# Patient Record
Sex: MALE | Race: WHITE | NOT HISPANIC OR LATINO | Employment: FULL TIME | URBAN - NONMETROPOLITAN AREA
[De-identification: names, ages, dates, MRNs, and addresses within clinical notes are randomized per-mention and may not be internally consistent; named-entity substitution may affect disease eponyms.]

---

## 2017-02-24 ENCOUNTER — HISTORY (OUTPATIENT)
Dept: FAMILY MEDICINE | Facility: OTHER | Age: 37
End: 2017-02-24

## 2017-02-24 ENCOUNTER — OFFICE VISIT - GICH (OUTPATIENT)
Dept: FAMILY MEDICINE | Facility: OTHER | Age: 37
End: 2017-02-24

## 2017-02-24 DIAGNOSIS — N50.811 PAIN IN RIGHT TESTICLE: ICD-10-CM

## 2018-01-03 NOTE — PROGRESS NOTES
Patient Information     Patient Name MRN Sex Steven See [id number] Male 1980      Progress Notes by Kaye Mercado NP at 2017 11:00 AM     Author:  Kaye Mercado NP Service:  (none) Author Type:  PHYS- Nurse Practitioner     Filed:  2017  9:10 AM Encounter Date:  2017 Status:  Signed     :  Kaye Mercado NP (PHYS- Nurse Practitioner)            SUBJECTIVE:    Steven Panda is a 36 y.o. male who presents for Testicular pain.     Testicle Pain   The patient's primary symptoms include testicular pain. The patient's pertinent negatives include no genital injury, genital itching, genital lesions, pelvic pain, penile discharge, penile pain, priapism or scrotal swelling. This is a new problem. The current episode started in the past 7 days. The problem occurs 2 to 4 times per day. The problem has been unchanged. The pain is mild. Pertinent negatives include no chest pain, chills, constipation, coughing, discolored urine, dysuria, fever, flank pain, frequency, hematuria, hesitancy, nausea, painful intercourse, rash, sore throat, urgency, urinary retention or vomiting. The testicular pain affects the right testicle. The color of the testicles is normal. The symptoms are aggravated by activity. He has tried nothing for the symptoms. He is sexually active. He never uses condoms. No, his partner does not have an STD. There is no history of BPH, chlamydia, cryptorchidism, erectile aid use, erectile dysfunction, a femoral hernia, gonorrhea, herpes simplex, kidney stones, prostatitis or sickle cell disease.       No current outpatient prescriptions on file prior to visit.     No current facility-administered medications on file prior to visit.        REVIEW OF SYSTEMS:  Review of Systems   Constitutional: Negative for chills and fever.   HENT: Negative for sore throat.    Respiratory: Negative for cough.    Cardiovascular: Negative for chest pain.   Gastrointestinal: Negative  "for constipation, nausea and vomiting.   Genitourinary: Positive for testicular pain. Negative for discharge, dysuria, flank pain, frequency, hesitancy, pelvic pain, penile pain, scrotal swelling and urgency.   Skin: Negative for rash.       OBJECTIVE:  /80  Pulse 80  Temp 98.6  F (37  C) (Temporal)  Ht 1.956 m (6' 5\")  Wt 89.4 kg (197 lb)  BMI 23.36 kg/m2    EXAM:   Physical Exam   Constitutional: He is oriented to person, place, and time and well-developed, well-nourished, and in no distress.   HENT:   Head: Normocephalic and atraumatic.   Eyes: Conjunctivae are normal.   Neck: Neck supple.   Cardiovascular: Normal rate.    Pulmonary/Chest: Effort normal. No respiratory distress.   Genitourinary: Penis normal. He exhibits no abnormal testicular mass, no testicular tenderness, no abnormal scrotal mass, no scrotal tenderness and no epididymal tenderness. Cremasteric reflex is present. Penis exhibits no lesions and no edema. No discharge found.   Genitourinary Comments: There is no hernia present, no redness, swelling. Not painful on exam. States the tenderness is on the scrotum and Behind the teste. Denies injury, no pain with sex. Both testes are in normal anatomical position.    Lymphadenopathy:     He has no cervical adenopathy.   Neurological: He is alert and oriented to person, place, and time.   Nursing note and vitals reviewed.      ASSESSMENT/PLAN:    ICD-10-CM    1. Testicular pain, right N50.811         Plan:  He wears boxers, advised scrotal support such as briefs, and pillow support when at rest. Suspect he could just have a strain/ irritation form type of undergarment he wears. Advised watchful waiting, change to briefs and f/u Monday if not better. I explained my diagnostic considerations and recommendations to the patient, who voiced understanding and agreement with the treatment plan. All questions were answered. We discussed potential side effects of any prescribed or recommended therapies, as " well as expectations for response to treatments. He was advised to contact our office if there is no improvement or worsening of conditions or symptoms.  If s/s worsen or persist, patient will either come back or follow up with PCP.       JAQUELINE WILSON NP ....................  2/27/2017   9:10 AM

## 2018-01-27 VITALS
TEMPERATURE: 98.6 F | HEIGHT: 77 IN | DIASTOLIC BLOOD PRESSURE: 80 MMHG | SYSTOLIC BLOOD PRESSURE: 120 MMHG | HEART RATE: 80 BPM | BODY MASS INDEX: 23.26 KG/M2 | WEIGHT: 197 LBS

## 2018-02-23 ENCOUNTER — DOCUMENTATION ONLY (OUTPATIENT)
Dept: FAMILY MEDICINE | Facility: OTHER | Age: 38
End: 2018-02-23

## 2018-04-18 ENCOUNTER — OFFICE VISIT (OUTPATIENT)
Dept: PEDIATRICS | Facility: OTHER | Age: 38
End: 2018-04-18
Attending: INTERNAL MEDICINE
Payer: COMMERCIAL

## 2018-04-18 VITALS
BODY MASS INDEX: 22.79 KG/M2 | DIASTOLIC BLOOD PRESSURE: 76 MMHG | SYSTOLIC BLOOD PRESSURE: 110 MMHG | HEART RATE: 74 BPM | HEIGHT: 77 IN | WEIGHT: 193 LBS

## 2018-04-18 DIAGNOSIS — Z82.79 FAMILY HISTORY OF MARFAN SYNDROME: Primary | ICD-10-CM

## 2018-04-18 DIAGNOSIS — G80.9 CEREBRAL PALSY, UNSPECIFIED TYPE (H): ICD-10-CM

## 2018-04-18 DIAGNOSIS — Z82.49 FAMILY HISTORY OF AORTIC ANEURYSM: ICD-10-CM

## 2018-04-18 PROCEDURE — 99213 OFFICE O/P EST LOW 20 MIN: CPT | Performed by: INTERNAL MEDICINE

## 2018-04-18 ASSESSMENT — ANXIETY QUESTIONNAIRES
5. BEING SO RESTLESS THAT IT IS HARD TO SIT STILL: NOT AT ALL
7. FEELING AFRAID AS IF SOMETHING AWFUL MIGHT HAPPEN: NOT AT ALL
IF YOU CHECKED OFF ANY PROBLEMS ON THIS QUESTIONNAIRE, HOW DIFFICULT HAVE THESE PROBLEMS MADE IT FOR YOU TO DO YOUR WORK, TAKE CARE OF THINGS AT HOME, OR GET ALONG WITH OTHER PEOPLE: NOT DIFFICULT AT ALL
6. BECOMING EASILY ANNOYED OR IRRITABLE: NOT AT ALL
2. NOT BEING ABLE TO STOP OR CONTROL WORRYING: NOT AT ALL
3. WORRYING TOO MUCH ABOUT DIFFERENT THINGS: NOT AT ALL
GAD7 TOTAL SCORE: 0
1. FEELING NERVOUS, ANXIOUS, OR ON EDGE: NOT AT ALL

## 2018-04-18 ASSESSMENT — PATIENT HEALTH QUESTIONNAIRE - PHQ9: 5. POOR APPETITE OR OVEREATING: NOT AT ALL

## 2018-04-18 ASSESSMENT — PAIN SCALES - GENERAL: PAINLEVEL: NO PAIN (0)

## 2018-04-18 NOTE — PROGRESS NOTES
"Subjective  Steven Panda is a 37 year old male who presents for discuss Marfan syndrome.  Both his brother and his 2 children were diagnosed with Marfan syndrome.  His brother is 33 years old and has an aortic root aneurysm found 5-6 years ago.  He himself has a slight cerebral palsy on the left side which was diagnosed after birth.  He wonders if that might make it more difficult to find symptoms of Marfan.  No hyper flexibility.  No vision issues.  Had an eye exam within the last couple of years.  No pectus deformity.    Problem List/PMH: reviewed in EMR, and made relevant updates today.  Medications: reviewed in EMR, and made relevant updates today.  Allergies: reviewed in EMR, and made relevant updates today.    Social Hx:  Social History   Substance Use Topics     Smoking status: Former Smoker     Smokeless tobacco: Never Used     Alcohol use Yes     Social History     Social History Narrative     I reviewed social history and made relevant updates today.    Family Hx:   Family History   Problem Relation Age of Onset     No Known Problems Mother      No Known Problems Father      Marfan Syndrome Brother      Marfan Syndrome Niece      Marfan Syndrome Nephew        Objective  Vitals: reviewed in EMR.  /76 (BP Location: Right arm, Patient Position: Sitting, Cuff Size: Adult Large)  Pulse 74  Ht 6' 5\" (1.956 m)  Wt 193 lb (87.5 kg)  BMI 22.89 kg/m2    Gen: Pleasant male, NAD.  HEENT: MMM, no OP erythema.   Neck: Supple, no JVD, no bruits.  CV: RRR no m/r/g.   Pulm: CTAB no w/r/r  Neuro: Grossly intact  Msk: No lower extremity edema.  Skin: No concerning lesions.  Psychiatric: Normal affect and insight. Does not appear anxious or depressed.    Assessment    ICD-10-CM    1. Family history of Marfan syndrome Z82.79 GENETICS REFERRAL     Echocardiogram Complete   2. Family history of aortic aneurysm Z82.49 GENETICS REFERRAL     Echocardiogram Complete   3. Cerebral palsy, unspecified type (H) G80.9  "     Orders Placed This Encounter   Procedures     GENETICS REFERRAL     Echocardiogram Complete       I am hopeful that since there is no known case of Marfan in the generation just prior that this may be a spontaneous mutation in his brother which had been passed to his children.    Plan   -- Expected clinical course discussed   --Genetics consultation   --Echocardiogram    Signed, Richard Duggan MD  Internal Medicine & Pediatrics

## 2018-04-18 NOTE — NURSING NOTE
Patient presents to clinic for consult for Marfan's syndrome.  Brother was diagnosed with this.  Is wondering what testing could be done for him.  Marilee Donaldson LPN ....................  4/18/2018   1:34 PM

## 2018-04-18 NOTE — MR AVS SNAPSHOT
"              After Visit Summary   4/18/2018    Steven Panda    MRN: [id number]           Patient Information     Date Of Birth          1980        Visit Information        Provider Department      4/18/2018 1:45 PM Richard Duggan MD [facility]        Today's Diagnoses     Family history of Marfan syndrome    -  1    Family history of aortic aneurysm           Follow-ups after your visit        Additional Services     GENETICS REFERRAL       [facility] or any                  Future tests that were ordered for you today     Open Future Orders        Priority Expected Expires Ordered    Echocardiogram Complete Routine  4/18/2019 4/18/2018            Who to contact     If you have questions or need follow up information about today's clinic visit or your schedule please contact [facility] directly at [phone].  Normal or non-critical lab and imaging results will be communicated to you by Kenshohart, letter or phone within 4 business days after the clinic has received the results. If you do not hear from us within 7 days, please contact the clinic through Kenshohart or phone. If you have a critical or abnormal lab result, we will notify you by phone as soon as possible.  Submit refill requests through English Helper or call your pharmacy and they will forward the refill request to us. Please allow 3 business days for your refill to be completed.          Additional Information About Your Visit        Kenshohart Information     English Helper lets you send messages to your doctor, view your test results, renew your prescriptions, schedule appointments and more. To sign up, go to [email or web address] . Click on \"Log in\" on the left side of the screen, which will take you to the Welcome page. Then click on \"Sign up Now\" on the right side of the page.     You will be asked to enter the access code listed below, as well as some personal information. Please follow the directions to " "create your username and password.     Your access code is: [email or web address]  Expires: 2018  2:00 PM     Your access code will  in 90 days. If you need help or a new code, please call your [facility] or [phone].        Care EveryWhere ID     This is your Care EveryWhere ID. This could be used by other organizations to access your [place] medical records  [id number]        Your Vitals Were     Pulse Height BMI (Body Mass Index)             74 6' 5\" (1.956 m) 22.89 kg/m2          Blood Pressure from Last 3 Encounters:   18 110/76   17 120/80   14 128/80    Weight from Last 3 Encounters:   18 193 lb (87.5 kg)   17 197 lb (89.4 kg)   14 180 lb 12.8 oz (82 kg)              We Performed the Following     GENETICS REFERRAL        Primary Care Provider Fax #    Physician No Ref-Primary [phone]       No address on file        Equal Access to Services     [facility]: Hadii aad ku hadasho Soanisa, waaxda luqadaha, qaybta kaalmada adeegyavi, stephanie madrid . So [facility] [phone].    ATENCIÓN: Si habla español, tiene a tyson disposición servicios gratuitos de asistencia lingüística. Llame al [phone].    We comply with applicable federal civil rights laws and [place] laws. We do not discriminate on the basis of race, color, national origin, age, disability, sex, sexual orientation, or gender identity.            Thank you!     Thank you for choosing [facility] AND [facility]  for your care. Our goal is always to provide you with excellent care. Hearing back from our patients is one way we can continue to improve our services. Please take a few minutes to complete the written survey that you may receive in the mail after your visit with us. Thank you!             Your Updated Medication List - Protect others around you: Learn how to safely use, store and throw away your medicines at www.disposemymeds.org.      Notice  As of " 4/18/2018  2:02 PM    You have not been prescribed any medications.

## 2018-04-19 ASSESSMENT — PATIENT HEALTH QUESTIONNAIRE - PHQ9: SUM OF ALL RESPONSES TO PHQ QUESTIONS 1-9: 0

## 2018-04-19 ASSESSMENT — ANXIETY QUESTIONNAIRES: GAD7 TOTAL SCORE: 0

## 2018-05-04 ENCOUNTER — HOSPITAL ENCOUNTER (OUTPATIENT)
Dept: CARDIOLOGY | Facility: OTHER | Age: 38
Discharge: HOME | End: 2018-05-04
Attending: INTERNAL MEDICINE | Admitting: INTERNAL MEDICINE
Payer: COMMERCIAL

## 2018-05-04 DIAGNOSIS — Z82.49 FAMILY HISTORY OF AORTIC ANEURYSM: ICD-10-CM

## 2018-05-04 DIAGNOSIS — Z82.79 FAMILY HISTORY OF MARFAN SYNDROME: ICD-10-CM

## 2018-05-04 PROCEDURE — 93306 TTE W/DOPPLER COMPLETE: CPT

## 2018-05-04 PROCEDURE — 93306 TTE W/DOPPLER COMPLETE: CPT | Mod: 26 | Performed by: INTERNAL MEDICINE

## 2018-06-12 ENCOUNTER — OFFICE VISIT (OUTPATIENT)
Dept: CARDIOLOGY | Facility: CLINIC | Age: 38
End: 2018-06-12
Attending: GENETIC COUNSELOR, MS
Payer: COMMERCIAL

## 2018-06-12 DIAGNOSIS — Z84.81 FAMILY HISTORY OF GENE MUTATION: ICD-10-CM

## 2018-06-12 DIAGNOSIS — Z82.79 FAMILY HISTORY OF MARFAN SYNDROME: ICD-10-CM

## 2018-06-12 DIAGNOSIS — Z84.81 FAMILY HISTORY OF GENE MUTATION: Primary | ICD-10-CM

## 2018-06-12 DIAGNOSIS — Z82.49 FAMILY HISTORY OF AORTIC ANEURYSM: Primary | ICD-10-CM

## 2018-06-12 LAB — MISCELLANEOUS TEST: NORMAL

## 2018-06-12 PROCEDURE — 96040 ZZH GENETIC COUNSELING, EACH 30 MINUTES: CPT | Mod: ZF | Performed by: GENETIC COUNSELOR, MS

## 2018-06-12 PROCEDURE — 36415 COLL VENOUS BLD VENIPUNCTURE: CPT | Performed by: GENETIC COUNSELOR, MS

## 2018-06-12 NOTE — LETTER
Date:June 13, 2018      Patient was self referred, no letter generated. Do not send.        [facility] Health Information

## 2018-06-12 NOTE — MR AVS SNAPSHOT
After Visit Summary   6/12/2018    Steven Panda    MRN: [id number]           Patient Information     Date Of Birth          1980        Visit Information        Provider Department      6/12/2018 12:00 PM Steffany Mendez GC [facility]        Today's Diagnoses     Family history of aortic aneurysm    -  1    Family history of Marfan syndrome           Follow-ups after your visit        Who to contact     If you have questions or need follow up information about today's clinic visit or your schedule please contact [facility] directly at [phone].  Normal or non-critical lab and imaging results will be communicated to you by Lendahart, letter or phone within 4 business days after the clinic has received the results. If you do not hear from us within 7 days, please contact the clinic through ObjectVideot or phone. If you have a critical or abnormal lab result, we will notify you by phone as soon as possible.  Submit refill requests through Clicktree or call your pharmacy and they will forward the refill request to us. Please allow 3 business days for your refill to be completed.          Additional Information About Your Visit        MyChart Information     Clicktree gives you secure access to your electronic health record. If you see a primary care provider, you can also send messages to your care team and make appointments. If you have questions, please call your primary care clinic.  If you do not have a primary care provider, please call [phone] and they will assist you.        Care EveryWhere ID     This is your Care EveryWhere ID. This could be used by other organizations to access your [place] medical records  [id number]         Blood Pressure from Last 3 Encounters:   04/18/18 110/76   02/24/17 120/80   09/09/14 128/80    Weight from Last 3 Encounters:   04/18/18 87.5 kg (193 lb)   02/24/17 89.4 kg (197 lb)   09/09/14 82 kg (180 lb 12.8 oz)               Today, you had the following     No orders found for display       Primary Care Provider Fax #    Physician No Ref-Primary [phone]       No address on file        Equal Access to Services     [facility] : Hadii aad ku hadkatherinemilvia Mathew, charityvi meadrizwanaha, arcadioliam lawsonlamin renner, stephanie riosleila ravindra. So [facility] [phone].    ATENCIÓN: Si habla español, tiene a tyson disposición servicios gratuitos de asistencia lingüística. Llame al [phone].    We comply with applicable federal civil rights laws and [place] laws. We do not discriminate on the basis of race, color, national origin, age, disability, sex, sexual orientation, or gender identity.            Thank you!     Thank you for choosing [facility]  for your care. Our goal is always to provide you with excellent care. Hearing back from our patients is one way we can continue to improve our services. Please take a few minutes to complete the written survey that you may receive in the mail after your visit with us. Thank you!             Your Updated Medication List - Protect others around you: Learn how to safely use, store and throw away your medicines at www.disposemymeds.org.      Notice  As of 6/12/2018  5:02 PM    You have not been prescribed any medications.

## 2018-06-12 NOTE — PROGRESS NOTES
"Here is a copy of the progress note from your recent genetic counseling visit [facility] on Date: 6/12/2018.    PROGRESS NOTE: Steven was seen for genetic counseling due to his family history of Marfan syndrome.  I had the opportunity to meet with Steven and his wife Steffany today to discuss the genetic component of Marfan syndrome and testing options available to him.     MEDICAL HISTORY: Steven reports that he is in overall good health.  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  He is 6'5\" but denies any history of hypermobility (negative thumb and wrist sign), pectus, scoliosis, flat feet, and pneumothorax.  He also reports that he had a normal ECHO and eye exam.    FAMILY HISTORY: A detailed family history was obtained at office visit on 6/12/2018.  (Please see scanned pedigree for details).  Family history was significant for the following: Steven's brother was diagnosed with aortic root dilation and had surgery to repair about 6 years ago. He has not had genetic testing for Marfan syndrome, but it is assumed he has this condition because his son and daughter (9 and 7 years) have both had genetic testing, which revealed a FBN1 gene mutation (c.5322 Ken). There is no additional history of Marfan syndrome, aortic aneurysms, sudden cardiac death, genetic conditions, or birth defects.     DISCUSSION:  Explained that Marfan syndrome is a connective tissue disorder associated with MVP, aortic aneurysms, tall stature, long fingers, scoliosis, pneumothorax, hypermobility, lens dislocation.  Explained Marfan syndrome is a genetic condition is usually inherited in families.  However, 25% of Marfan cases are the result of new mutations, meaning that it was not inherited from an affected family member.  75% of the time the condition is passed on from an affected parent.  Reviewed autosomal dominant (AD) inheritance " associated Marfan syndrome including the 50% risk for recurrence.  If Steven's brother has Marfan syndrome as the result of a new mutation, Steven would not be at increased risk.  However, if his brother inherited a mutation from one of their parents, Steven would have a 50% risk.  Since Steven has a son now, he is interested in learning if he has the familial gene mutation.    Genetic testing can be performed to look for the familial mutation found in his niece and nephew.  Testing will either identify a mutation or not.  If Steven does not carry the mutation, he does not have Marfan syndrome and his children would not be at risk.    Test results take approximately 1-2 weeks on average. Discussed cost ($350) of testing for familial variant through [facility] ([facility]). Explained that the lab does not bill insurance but patient will be able to submit charges to insurance for possible reimbursement.     Explained that clinical evaluation of aorta is recommended for all first degree relatives (parents, siblings, and children) of an affected individual regardless of decision to pursue genetic testing.   All questions answered at this time.     PLAN: Steven elected to proceed with genetic testing.  Requisition and consent forms were signed.  Blood was drawn and sent to [facility] lab.  I will contact patient when results are available.    TOTAL TIME SPENT IN COUNSELIN minutes    Steffany Mendez MS  Licensed Genetic Counselor  [facility]

## 2018-06-12 NOTE — LETTER
"6/12/2018      RE: Steven Panda  [address]  [address]  [place] [address]       Dear Colleague,    Thank you for the opportunity to participate in the care of your patient, Steven Panda, at the [facility]. Please see a copy of my visit note below.    Here is a copy of the progress note from your recent genetic counseling visit to [facility] on Date: 6/12/2018.    PROGRESS NOTE: Steven was seen for genetic counseling due to his family history of Marfan syndrome.  I had the opportunity to meet with Steven and his wife Steffany today to discuss the genetic component of Marfan syndrome and testing options available to him.     MEDICAL HISTORY: Steven reports that he is in overall good health.  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  He is 6'5\" but denies any history of hypermobility (negative thumb and wrist sign), pectus, scoliosis, flat feet, and pneumothorax.  He also reports that he had a normal ECHO and eye exam.    FAMILY HISTORY: A detailed family history was obtained at office visit on 6/12/2018.  (Please see scanned pedigree for details).  Family history was significant for the following: Steven's brother was diagnosed with aortic root dilation and had surgery to repair about 6 years ago. He has not had genetic testing for Marfan syndrome, but it is assumed he has this condition because his son and daughter (9 and 7 years) have both had genetic testing, which revealed a FBN1 gene mutation (c.5322 Ken). There is no additional history of Marfan syndrome, aortic aneurysms, sudden cardiac death, genetic conditions, or birth defects.     DISCUSSION:  Explained that Marfan syndrome is a connective tissue disorder associated with MVP, aortic aneurysms, tall stature, long fingers, scoliosis, pneumothorax, hypermobility, lens dislocation.  " Explained Marfan syndrome is a genetic condition is usually inherited in families.  However, 25% of Marfan cases are the result of new mutations, meaning that it was not inherited from an affected family member.  75% of the time the condition is passed on from an affected parent.  Reviewed autosomal dominant (AD) inheritance associated Marfan syndrome including the 50% risk for recurrence.  If Steven's brother has Marfan syndrome as the result of a new mutation, Steven would not be at increased risk.  However, if his brother inherited a mutation from one of their parents, Steven would have a 50% risk.  Since Steven has a son now, he is interested in learning if he has the familial gene mutation.    Genetic testing can be performed to look for the familial mutation found in his niece and nephew.  Testing will either identify a mutation or not.  If Steven does not carry the mutation, he does not have Marfan syndrome and his children would not be at risk.    Test results take approximately 1-2 weeks on average. Discussed cost ($350) of testing for familial variant through [facility] ([facility]). Explained that the lab does not bill insurance but patient will be able to submit charges to insurance for possible reimbursement.     Explained that clinical evaluation of aorta is recommended for all first degree relatives (parents, siblings, and children) of an affected individual regardless of decision to pursue genetic testing.   All questions answered at this time.     PLAN: Steven elected to proceed with genetic testing.  Requisition and consent forms were signed.  Blood was drawn and sent to [facility] lab.  I will contact patient when results are available.    TOTAL TIME SPENT IN COUNSELIN minutes    Steffany Mendez MS  Licensed Genetic Counselor  [facility]        Please do not hesitate to contact me if you have any questions/concerns.     Sincerely,     Steffany Reza  Vanessa, GC

## 2018-06-19 ENCOUNTER — TELEPHONE (OUTPATIENT)
Dept: CARDIOLOGY | Facility: CLINIC | Age: 38
End: 2018-06-19

## 2018-06-19 LAB — LAB SCANNED RESULT: NORMAL

## 2018-06-19 NOTE — TELEPHONE ENCOUNTER
Spoke with Steven's wife Steffany, per his request, today to review results of genetic testing. Steven underwent genetic testing on June 12, 2018 due to the family history of Marfan syndrome and a known genetic mutation in his niece and nephew.  Genetic testing for the familial mutation was sent to [facility] lab and revealed that Steven does NOT carry a mutation in the FBN1 gene.   Based on these results, clinical screening for Marfan syndrome is not recommended for Steven at this time.   A summary letter and copy of the results will be sent to patient. All questions answered at this time.   Steffany Mendez MS  Licensed Genetic Counselor  [facility]

## 2018-06-22 ENCOUNTER — TELEPHONE (OUTPATIENT)
Dept: CARDIOLOGY | Facility: CLINIC | Age: 38
End: 2018-06-22

## 2019-02-21 ENCOUNTER — OFFICE VISIT (OUTPATIENT)
Dept: FAMILY MEDICINE | Facility: OTHER | Age: 39
End: 2019-02-21
Attending: NURSE PRACTITIONER
Payer: COMMERCIAL

## 2019-02-21 ENCOUNTER — HOSPITAL ENCOUNTER (OUTPATIENT)
Dept: GENERAL RADIOLOGY | Facility: OTHER | Age: 39
Discharge: HOME | End: 2019-02-21
Attending: NURSE PRACTITIONER | Admitting: NURSE PRACTITIONER
Payer: COMMERCIAL

## 2019-02-21 VITALS
BODY MASS INDEX: 21.91 KG/M2 | RESPIRATION RATE: 14 BRPM | OXYGEN SATURATION: 94 % | WEIGHT: 184.8 LBS | HEART RATE: 99 BPM | DIASTOLIC BLOOD PRESSURE: 80 MMHG | SYSTOLIC BLOOD PRESSURE: 128 MMHG | TEMPERATURE: 100 F

## 2019-02-21 DIAGNOSIS — R05.9 COUGH: ICD-10-CM

## 2019-02-21 DIAGNOSIS — J18.9 PNEUMONIA OF LEFT LOWER LOBE DUE TO INFECTIOUS ORGANISM: Primary | ICD-10-CM

## 2019-02-21 LAB
BASOPHILS # BLD AUTO: 0 10E9/L (ref 0–0.2)
BASOPHILS NFR BLD AUTO: 0.2 %
DIFFERENTIAL METHOD BLD: ABNORMAL
EOSINOPHIL # BLD AUTO: 0.1 10E9/L (ref 0–0.7)
EOSINOPHIL NFR BLD AUTO: 0.8 %
ERYTHROCYTE [DISTWIDTH] IN BLOOD BY AUTOMATED COUNT: 11.7 % (ref 10–15)
FLUAV+FLUBV RNA SPEC QL NAA+PROBE: NEGATIVE
FLUAV+FLUBV RNA SPEC QL NAA+PROBE: NEGATIVE
HCT VFR BLD AUTO: 31.8 % (ref 40–53)
HGB BLD-MCNC: 11 G/DL (ref 13.3–17.7)
IMM GRANULOCYTES # BLD: 0.1 10E9/L (ref 0–0.4)
IMM GRANULOCYTES NFR BLD: 0.7 %
LYMPHOCYTES # BLD AUTO: 2.1 10E9/L (ref 0.8–5.3)
LYMPHOCYTES NFR BLD AUTO: 13.8 %
MCH RBC QN AUTO: 31.2 PG (ref 26.5–33)
MCHC RBC AUTO-ENTMCNC: 34.6 G/DL (ref 31.5–36.5)
MCV RBC AUTO: 90 FL (ref 78–100)
MONOCYTES # BLD AUTO: 0.8 10E9/L (ref 0–1.3)
MONOCYTES NFR BLD AUTO: 5.6 %
NEUTROPHILS # BLD AUTO: 11.9 10E9/L (ref 1.6–8.3)
NEUTROPHILS NFR BLD AUTO: 78.9 %
PLATELET # BLD AUTO: 395 10E9/L (ref 150–450)
RBC # BLD AUTO: 3.53 10E12/L (ref 4.4–5.9)
RSV RNA SPEC NAA+PROBE: NEGATIVE
SPECIMEN SOURCE: NORMAL
WBC # BLD AUTO: 15 10E9/L (ref 4–11)

## 2019-02-21 PROCEDURE — 36415 COLL VENOUS BLD VENIPUNCTURE: CPT | Performed by: NURSE PRACTITIONER

## 2019-02-21 PROCEDURE — 99214 OFFICE O/P EST MOD 30 MIN: CPT | Performed by: NURSE PRACTITIONER

## 2019-02-21 PROCEDURE — 85025 COMPLETE CBC W/AUTO DIFF WBC: CPT | Performed by: NURSE PRACTITIONER

## 2019-02-21 PROCEDURE — 71046 X-RAY EXAM CHEST 2 VIEWS: CPT

## 2019-02-21 PROCEDURE — 87631 RESP VIRUS 3-5 TARGETS: CPT | Performed by: NURSE PRACTITIONER

## 2019-02-21 RX ORDER — ALBUTEROL SULFATE 90 UG/1
2 AEROSOL, METERED RESPIRATORY (INHALATION) 4 TIMES DAILY
Qty: 1 INHALER | Refills: 0 | Status: SHIPPED | OUTPATIENT
Start: 2019-02-21 | End: 2020-12-11

## 2019-02-21 NOTE — NURSING NOTE
Chief Complaint   Patient presents with     URI     Medication Reconciliation: complete     Patient is here today for URI. He is experiencing cough, body aches, and head congestion. He is having a dry cough, headaches, facial pressure. Nasal congestion. Is able to blow nose and get drainage out of left side only. Sx x 3 weeks.    Shara Pharmer, CMA

## 2019-02-21 NOTE — PROGRESS NOTES
SUBJECTIVE:   Steven Panda is a 38 year old male who presents to clinic today for the following health issues:    HPI  Intermittent symptoms for 3 weeks - cough, congestion and body aches. Chills and sweats.   Eating and drinking well. No sore throat. Taking OTC cold meds without benefit.      Patient Active Problem List    Diagnosis Date Noted     Cerebral palsy, unspecified type (H) 04/18/2018     Priority: Medium     History reviewed. No pertinent past medical history.   History reviewed. No pertinent surgical history.  Family History   Problem Relation Age of Onset     No Known Problems Mother      No Known Problems Father      Marfan Syndrome Brother      Marfan Syndrome Niece      Marfan Syndrome Nephew      Social History     Tobacco Use     Smoking status: Former Smoker     Smokeless tobacco: Never Used   Substance Use Topics     Alcohol use: Yes     Social History     Social History Narrative     Not on file     No current outpatient medications on file.     No Known Allergies    Review of Systems   Constitutional: Positive for fatigue and fever.   HENT: Positive for congestion, rhinorrhea and sinus pressure.    Respiratory: Positive for cough.    Gastrointestinal: Negative for abdominal pain.        OBJECTIVE:     /80   Pulse 92   Temp 100  F (37.8  C) (Tympanic)   Resp 14   Wt 83.8 kg (184 lb 12.8 oz)   SpO2 99%   BMI 21.91 kg/m    Body mass index is 21.91 kg/m .  Physical Exam   Constitutional: He is oriented to person, place, and time. He appears well-developed and well-nourished. No distress.   HENT:   Head: Normocephalic.   Right Ear: Tympanic membrane and external ear normal.   Left Ear: Tympanic membrane and external ear normal.   Nose: Rhinorrhea (Thick green drainage) present.   Mouth/Throat: Oropharynx is clear and moist.   Eyes: Conjunctivae are normal. No scleral icterus.   Neck: Normal range of motion. Neck supple.   Cardiovascular: Normal rate, regular rhythm and normal heart  sounds.   Pulmonary/Chest: Effort normal. No respiratory distress. He has wheezes.   Musculoskeletal: Normal range of motion.   Lymphadenopathy:     He has no cervical adenopathy.   Neurological: He is alert and oriented to person, place, and time.   Skin: Skin is warm and dry. He is not diaphoretic.   Nursing note and vitals reviewed.      Diagnostic Test Results:  Results for orders placed or performed in visit on 02/21/19   XR Chest 2 Views    Narrative    PROCEDURE:  XR CHEST 2 VW    HISTORY:  Cough.     COMPARISON:  None.    FINDINGS:   The cardiac silhouette is normal in size. The pulmonary vasculature is  normal.  There is a left lower lobe infiltrate consistent with  pneumonia. The right lung is clear. No pleural effusion or  pneumothorax.      Impression    IMPRESSION:  Left lower lobe pneumonia      ESTEFANI BELLA MD   CBC and Differential   Result Value Ref Range    WBC 15.0 (H) 4.0 - 11.0 10e9/L    RBC Count 3.53 (L) 4.4 - 5.9 10e12/L    Hemoglobin 11.0 (L) 13.3 - 17.7 g/dL    Hematocrit 31.8 (L) 40.0 - 53.0 %    MCV 90 78 - 100 fl    MCH 31.2 26.5 - 33.0 pg    MCHC 34.6 31.5 - 36.5 g/dL    RDW 11.7 10.0 - 15.0 %    Platelet Count 395 150 - 450 10e9/L    Diff Method Automated Method     % Neutrophils 78.9 %    % Lymphocytes 13.8 %    % Monocytes 5.6 %    % Eosinophils 0.8 %    % Basophils 0.2 %    % Immature Granulocytes 0.7 %    Absolute Neutrophil 11.9 (H) 1.6 - 8.3 10e9/L    Absolute Lymphocytes 2.1 0.8 - 5.3 10e9/L    Absolute Monocytes 0.8 0.0 - 1.3 10e9/L    Absolute Eosinophils 0.1 0.0 - 0.7 10e9/L    Absolute Basophils 0.0 0.0 - 0.2 10e9/L    Abs Immature Granulocytes 0.1 0 - 0.4 10e9/L   Influenza A and B and RSV PCR   Result Value Ref Range    Specimen Description Nasal     Influenza A PCR Negative NEG^Negative    Influenza B PCR Negative NEG^Negative    Resp Syncytial Virus Negative NEG^Negative      Recent Results (from the past 48 hour(s))   XR Chest 2 Views    Narrative    PROCEDURE:  XR  CHEST 2 VW    HISTORY:  Cough.     COMPARISON:  None.    FINDINGS:   The cardiac silhouette is normal in size. The pulmonary vasculature is  normal.  There is a left lower lobe infiltrate consistent with  pneumonia. The right lung is clear. No pleural effusion or  pneumothorax.      Impression    IMPRESSION:  Left lower lobe pneumonia      ESTEFANI BELLA MD       ASSESSMENT/PLAN:     1. Pneumonia of left lower lobe due to infectious organism (H)    - amoxicillin-clavulanate (AUGMENTIN) 875-125 MG tablet; Take 1 tablet by mouth 2 times daily for 10 days  Dispense: 20 tablet; Refill: 0  - albuterol (PROAIR HFA/PROVENTIL HFA/VENTOLIN HFA) 108 (90 Base) MCG/ACT inhaler; Inhale 2 puffs into the lungs 4 times daily  Dispense: 1 Inhaler; Refill: 0    2. Cough     - Influenza A and B and RSV PCR  - XR Chest 2 Views  - CBC and Differential    Patient doesn't appear toxic, O2 sat 99% RA, temp 100, no labored breathing.   CBC 15.0, absolute neutrophils 11.9. CXR LLL pneumonia.   Discussed patient with ED provider d/t history of CP and current pneumonia.  His vitals are fairly stable, he's not toxic and appears reliable.   Can treat as outpatient with close f/u and advised to return to ED if sx worsen or concerns develop.  Encouraged to push fluids and rest. Discharging on Augmentin and albuterol to cover his sinus and pneumonia.   Scheduled f/u appointment today. Given Epic educational materials.   I explained my diagnostic considerations and recommendations to the patient, who voiced understanding and agreement with the treatment plan. All questions were answered. We discussed potential side effects of any prescribed or recommended therapies, as well as expectations for response to treatments.

## 2019-02-22 ENCOUNTER — OFFICE VISIT (OUTPATIENT)
Dept: INTERNAL MEDICINE | Facility: OTHER | Age: 39
End: 2019-02-22
Attending: INTERNAL MEDICINE
Payer: COMMERCIAL

## 2019-02-22 VITALS
HEART RATE: 104 BPM | BODY MASS INDEX: 21.32 KG/M2 | RESPIRATION RATE: 16 BRPM | DIASTOLIC BLOOD PRESSURE: 78 MMHG | TEMPERATURE: 97.7 F | WEIGHT: 179.8 LBS | SYSTOLIC BLOOD PRESSURE: 120 MMHG | OXYGEN SATURATION: 93 %

## 2019-02-22 DIAGNOSIS — G80.9 CEREBRAL PALSY, UNSPECIFIED TYPE (H): ICD-10-CM

## 2019-02-22 DIAGNOSIS — Z09 HOSPITAL DISCHARGE FOLLOW-UP: Primary | ICD-10-CM

## 2019-02-22 DIAGNOSIS — J18.9 PNEUMONIA OF LEFT LOWER LOBE DUE TO INFECTIOUS ORGANISM: ICD-10-CM

## 2019-02-22 PROCEDURE — 99214 OFFICE O/P EST MOD 30 MIN: CPT | Performed by: INTERNAL MEDICINE

## 2019-02-22 RX ORDER — AZITHROMYCIN 250 MG/1
TABLET, FILM COATED ORAL
Qty: 6 TABLET | Refills: 0 | Status: SHIPPED | OUTPATIENT
Start: 2019-02-22 | End: 2019-05-01

## 2019-02-22 ASSESSMENT — ENCOUNTER SYMPTOMS
BRUISES/BLEEDS EASILY: 0
ABDOMINAL PAIN: 0
BACK PAIN: 0
CHILLS: 1
COUGH: 1
FATIGUE: 1
CHEST TIGHTNESS: 0
HEMATURIA: 0
CONFUSION: 0
WOUND: 0
ADENOPATHY: 0
SHORTNESS OF BREATH: 1
FEVER: 0
WHEEZING: 1

## 2019-02-22 ASSESSMENT — PAIN SCALES - GENERAL: PAINLEVEL: NO PAIN (0)

## 2019-02-22 NOTE — PROGRESS NOTES
"Nursing Notes:   Kaye Mauro LPN  2/22/2019  3:39 PM  Signed  Chief Complaint   Patient presents with     RECHECK   Pt present to clinic today for a follow up RC visit.    Initial /78 (BP Location: Right arm)   Pulse 104   Temp 97.7  F (36.5  C) (Tympanic)   Resp 16   Wt 81.6 kg (179 lb 12.8 oz)   SpO2 93%   BMI 21.32 kg/m    Estimated body mass index is 21.32 kg/m  as calculated from the following:    Height as of 4/18/18: 1.956 m (6' 5\").    Weight as of this encounter: 81.6 kg (179 lb 12.8 oz).  Medication Reconciliation: complete    Kaye Mauro LPN  Nursing note reviewed with patient.  Accuracy and completeness verified.   Mr. Panda is a 38 year old male who:  Patient presents with:  RECHECK      ICD-10-CM    1. Hospital discharge follow-up Z09    2. Pneumonia of left lower lobe due to infectious organism (H) J18.1 azithromycin (ZITHROMAX) 250 MG tablet   3. Cerebral palsy, unspecified type (H) G80.9      HPI  Patient presents for rapid clinic follow-up.  He was started on Augmentin 2/21 due to left-sided pneumonia.  States that his breathing and cough and shortness of breath and fatigue are starting to improve already.  He just had one dose of Augmentin last night and this morning.  Albuterol inhaler has been helpful.    Given his pneumonia, we will initiate Zithromax in addition to Augmentin.    Continue albuterol inhaler as needed.    Functional Capacity: normally > 4 METS.   No orthopnea/paroxysmal nocturnal dyspnea  Review of Systems   Constitutional: Positive for chills and fatigue. Negative for fever.   HENT: Negative for congestion.    Eyes: Negative for visual disturbance.   Respiratory: Positive for cough, shortness of breath and wheezing (+ resolving). Negative for chest tightness.    Cardiovascular: Negative for chest pain.   Gastrointestinal: Negative for abdominal pain.   Genitourinary: Negative for hematuria.   Musculoskeletal: Negative for back pain.   Skin: Negative for " rash and wound.   Allergic/Immunologic: Negative for immunocompromised state.   Neurological: Negative for syncope.   Hematological: Negative for adenopathy. Does not bruise/bleed easily.   Psychiatric/Behavioral: Negative for confusion.      ARMINDA:   ARMINDA-7 SCORE 4/18/2018   Total Score 0     PHQ9:  PHQ-9 SCORE 4/18/2018   PHQ-9 Total Score 0       I have personally reviewed the past medical history, past surgical history, medications, allergies, family and social history as listed below.     No Known Allergies    Current Outpatient Medications   Medication Sig Dispense Refill     albuterol (PROAIR HFA/PROVENTIL HFA/VENTOLIN HFA) 108 (90 Base) MCG/ACT inhaler Inhale 2 puffs into the lungs 4 times daily 1 Inhaler 0     amoxicillin-clavulanate (AUGMENTIN) 875-125 MG tablet Take 1 tablet by mouth 2 times daily for 10 days 20 tablet 0     azithromycin (ZITHROMAX) 250 MG tablet Take 2 tablets (500 mg) by mouth daily for 1 day, THEN 1 tablet (250 mg) daily for 4 days. 6 tablet 0        Patient Active Problem List    Diagnosis Date Noted     Cerebral palsy, unspecified type (H) 04/18/2018     Priority: Medium     History reviewed. No pertinent past medical history.  History reviewed. No pertinent surgical history.  Social History     Socioeconomic History     Marital status:      Spouse name: None     Number of children: None     Years of education: None     Highest education level: None   Occupational History     None   Social Needs     Financial resource strain: None     Food insecurity:     Worry: None     Inability: None     Transportation needs:     Medical: None     Non-medical: None   Tobacco Use     Smoking status: Former Smoker     Smokeless tobacco: Never Used   Substance and Sexual Activity     Alcohol use: Yes     Drug use: No     Comment: Drug use: No     Sexual activity: Yes   Lifestyle     Physical activity:     Days per week: None     Minutes per session: None     Stress: None   Relationships      "Social connections:     Talks on phone: None     Gets together: None     Attends Alevism service: None     Active member of club or organization: None     Attends meetings of clubs or organizations: None     Relationship status: None     Intimate partner violence:     Fear of current or ex partner: None     Emotionally abused: None     Physically abused: None     Forced sexual activity: None   Other Topics Concern     None   Social History Narrative     None     Family History   Problem Relation Age of Onset     No Known Problems Mother      No Known Problems Father      Marfan Syndrome Brother      Marfan Syndrome Niece      Marfan Syndrome Nephew        EXAM:   Vitals:    02/22/19 1521   BP: 120/78   BP Location: Right arm   Pulse: 104   Resp: 16   Temp: 97.7  F (36.5  C)   TempSrc: Tympanic   SpO2: 93%   Weight: 81.6 kg (179 lb 12.8 oz)       Current Pain Score: No Pain (0)     BP Readings from Last 3 Encounters:   02/22/19 120/78   02/21/19 128/80   04/18/18 110/76      Wt Readings from Last 3 Encounters:   02/22/19 81.6 kg (179 lb 12.8 oz)   02/21/19 83.8 kg (184 lb 12.8 oz)   04/18/18 87.5 kg (193 lb)      Estimated body mass index is 21.32 kg/m  as calculated from the following:    Height as of 4/18/18: 1.956 m (6' 5\").    Weight as of this encounter: 81.6 kg (179 lb 12.8 oz).     Physical Exam   Constitutional: He appears well-developed and well-nourished. No distress.   HENT:   Head: Normocephalic and atraumatic.   Eyes: Conjunctivae are normal. No scleral icterus.   Neck: Neck supple.   Cardiovascular: Normal rate and regular rhythm.   Pulmonary/Chest: Effort normal. He has wheezes. He has rales.   Abdominal: Soft. There is no tenderness.   Musculoskeletal: He exhibits no deformity.   Lymphadenopathy:     He has no cervical adenopathy.   Neurological: He is alert.   Skin: Skin is warm and dry. No rash noted. He is not diaphoretic. There is pallor.   Psychiatric: He has a normal mood and affect.    "     Procedures   INVESTIGATIONS:  Results for orders placed or performed in visit on 02/21/19   XR Chest 2 Views    Narrative    PROCEDURE:  XR CHEST 2 VW    HISTORY:  Cough.     COMPARISON:  None.    FINDINGS:   The cardiac silhouette is normal in size. The pulmonary vasculature is  normal.  There is a left lower lobe infiltrate consistent with  pneumonia. The right lung is clear. No pleural effusion or  pneumothorax.      Impression    IMPRESSION:  Left lower lobe pneumonia      ESTEFANI BELLA MD   CBC and Differential   Result Value Ref Range    WBC 15.0 (H) 4.0 - 11.0 10e9/L    RBC Count 3.53 (L) 4.4 - 5.9 10e12/L    Hemoglobin 11.0 (L) 13.3 - 17.7 g/dL    Hematocrit 31.8 (L) 40.0 - 53.0 %    MCV 90 78 - 100 fl    MCH 31.2 26.5 - 33.0 pg    MCHC 34.6 31.5 - 36.5 g/dL    RDW 11.7 10.0 - 15.0 %    Platelet Count 395 150 - 450 10e9/L    Diff Method Automated Method     % Neutrophils 78.9 %    % Lymphocytes 13.8 %    % Monocytes 5.6 %    % Eosinophils 0.8 %    % Basophils 0.2 %    % Immature Granulocytes 0.7 %    Absolute Neutrophil 11.9 (H) 1.6 - 8.3 10e9/L    Absolute Lymphocytes 2.1 0.8 - 5.3 10e9/L    Absolute Monocytes 0.8 0.0 - 1.3 10e9/L    Absolute Eosinophils 0.1 0.0 - 0.7 10e9/L    Absolute Basophils 0.0 0.0 - 0.2 10e9/L    Abs Immature Granulocytes 0.1 0 - 0.4 10e9/L   Influenza A and B and RSV PCR   Result Value Ref Range    Specimen Description Nasal     Influenza A PCR Negative NEG^Negative    Influenza B PCR Negative NEG^Negative    Resp Syncytial Virus Negative NEG^Negative       ASSESSMENT AND PLAN:  Problem List Items Addressed This Visit        Nervous and Auditory    Cerebral palsy, unspecified type (H)      Other Visit Diagnoses     Hospital discharge follow-up    -  Primary    Pneumonia of left lower lobe due to infectious organism (H)        Relevant Medications    azithromycin (ZITHROMAX) 250 MG tablet        reviewed diet, exercise and weight control  -- Expected clinical course discussed     -- Medications and their side effects discussed    Patient Instructions   Left-sided pneumonia.  Continue Augmentin until gone.      Start Zithromax, take as directed for 5 days.    Use albuterol inhaler as needed.    Push oral hydration, prevent dehydration.    Return as needed for follow-up with Dr. Han.    Clinic : [phone]  Appointment line: [phone]      Victor M Han MD  Internal Medicine  [facility]     Portions of this note were dictated using speech recognition software. The note has been proofread but errors in the text may have been overlooked. Please contact me if there are any concerns regarding the accuracy of the dictation.

## 2019-02-22 NOTE — NURSING NOTE
"Chief Complaint   Patient presents with     RECHECK   Pt present to clinic today for a follow up RC visit.    Initial /78 (BP Location: Right arm)   Pulse 104   Temp 97.7  F (36.5  C) (Tympanic)   Resp 16   Wt 81.6 kg (179 lb 12.8 oz)   SpO2 93%   BMI 21.32 kg/m   Estimated body mass index is 21.32 kg/m  as calculated from the following:    Height as of 4/18/18: 1.956 m (6' 5\").    Weight as of this encounter: 81.6 kg (179 lb 12.8 oz).  Medication Reconciliation: complete    Kaye Mauro LPN  "

## 2019-02-22 NOTE — PATIENT INSTRUCTIONS
Left-sided pneumonia.  Continue Augmentin until gone.      Start Zithromax, take as directed for 5 days.    Use albuterol inhaler as needed.    Push oral hydration, prevent dehydration.    Return as needed for follow-up with Dr. Han.    Clinic : [phone]  Appointment line: [phone]

## 2019-02-22 NOTE — PATIENT INSTRUCTIONS
Patient Education   Take medications as ordered.   Use inhaler as instructed.  F/u as scheduled.  Return to ER if symptoms worsen.        Treating Pneumonia  Pneumonia is an infection of one or both of the lungs. Pneumonia:    Is usually caused by either a virus or a bacteria    Can be very serious, especially in infants, young children, and older adults. It s also serious for those with other long-term health problems or weakened immune systems.    Is sometimes treated at home and sometimes in the hospital    Antibiotic medicines  Antibiotics may be prescribed for pneumonia caused by bacteria. They may be pills (oral medicines), or shots (injections). Or they may be given by IV (intravenously) into a vein. If you are taking oral medicines at home:    Fill your prescription and start taking your medicine as soon as you can.    You will likely start to feel better in a day or 2, but don t stop taking the antibiotic.    Use a pill organizer to help you remember to take your medicine.    Let your healthcare provider know if you have side effects.    Take your medicine exactly as directed on the label. Talk to your provider or pharmacist if you have any questions.  Antiviral medicines  Antiviral medicine may be prescribed for pneumonia caused by a virus. For example, antiviral medicine may be prescribed for pneumonia caused by the flu virus. Antibiotics do not work against viruses. If you are taking antiviral medicine at home:    Fill your prescription and start taking your medicine as soon as you can.    Talk with your provider or pharmacist about possible side effects.    Take the medicine exactly as instructed.  To relieve symptoms  There are many medicines that can help relieve symptoms of pneumonia. Some are prescription and some are over-the-counter.  Your healthcare provider may recommend:    Acetaminophen or ibuprofen to lower your fever and to lessen headache or other pain    Cough medicine to loosen mucus or  to reduce coughing  Make sure you check with your healthcare provider or pharmacist before taking any over-the-counter medicines.  Special treatments  If you are hospitalized for pneumonia, you may have other therapies, including:    Inhaled medicines to help with breathing or chest congestion    Supplemental oxygen to increase low oxygen levels  Drink fluids and eat healthy  You should eat healthy to help your body fight the infection. Drinking a lot of fluids helps to replace fluids lost from fever and to loosen mucus in your chest.    Diet. Make healthy food choices, including fruits and vegetables, lean meats and other proteins, 100% whole grain and low- or no-fat dairy products.    Fluids. Drink at least 6 to 8 tall glasses a day. Water and 100% fruit or vegetable juice are best.  Get plenty of rest and sleep  You may be more tired than usual for a while. It is important to get enough sleep at night. It s also important to rest during the day. Talk with your healthcare provider if coughing or other symptoms are interfering with your sleep.  Preventing the spread of germs  The best thing you can do to prevent spreading germs is to wash your hands often. You should:    Rub your hand with soap and water for 20 to 30 seconds.    Clean in between your fingers, the backs of your hands, and around your nails.    Dry your hands on a separate towel or use paper towels.  You should also:    Keep alcohol-based hand  nearby.    Make sure you also clean surfaces that you touch. Use a product that kills all types of germs.    Stay away from others until you are feeling better.  When to call your healthcare provider  Call your healthcare provider if you have any of the following:    Symptoms get worse    Fever continues    Shortness of breath gets worse    Increased mucus or mucus that is darker in color    Coughing gets worse    Lips or fingers are bluish in color    Side effects from your medicine   Date Last  Reviewed: 12/1/2016 2000-2018 [employer]. [address] [place] [address], [place] [address]. All rights reserved. This information is not intended as a substitute for professional medical care. Always follow your healthcare professional's instructions.

## 2019-02-23 ASSESSMENT — ENCOUNTER SYMPTOMS
FEVER: 1
SINUS PRESSURE: 1
FATIGUE: 1
ABDOMINAL PAIN: 0
RHINORRHEA: 1
COUGH: 1

## 2019-05-01 ENCOUNTER — OFFICE VISIT (OUTPATIENT)
Dept: FAMILY MEDICINE | Facility: OTHER | Age: 39
End: 2019-05-01
Attending: NURSE PRACTITIONER
Payer: COMMERCIAL

## 2019-05-01 VITALS
HEART RATE: 64 BPM | DIASTOLIC BLOOD PRESSURE: 80 MMHG | TEMPERATURE: 98.8 F | OXYGEN SATURATION: 99 % | SYSTOLIC BLOOD PRESSURE: 124 MMHG | HEIGHT: 77 IN | RESPIRATION RATE: 20 BRPM | BODY MASS INDEX: 21.23 KG/M2 | WEIGHT: 179.8 LBS

## 2019-05-01 DIAGNOSIS — J06.9 VIRAL URI WITH COUGH: Primary | ICD-10-CM

## 2019-05-01 PROCEDURE — 99213 OFFICE O/P EST LOW 20 MIN: CPT | Performed by: NURSE PRACTITIONER

## 2019-05-01 ASSESSMENT — PAIN SCALES - GENERAL: PAINLEVEL: NO PAIN (0)

## 2019-05-01 ASSESSMENT — MIFFLIN-ST. JEOR: SCORE: 1850.01

## 2019-05-01 NOTE — NURSING NOTE
"Chief Complaint   Patient presents with     Cough     productive cough ans sinus congestion     Had pneumonia a month ago.  O2 sat 99%.    Initial /80   Pulse 64   Temp 98.8  F (37.1  C) (Temporal)   Resp 20   Ht 1.943 m (6' 4.5\")   Wt 81.6 kg (179 lb 12.8 oz)   SpO2 (!) 89%   BMI 21.60 kg/m   Estimated body mass index is 21.6 kg/m  as calculated from the following:    Height as of this encounter: 1.943 m (6' 4.5\").    Weight as of this encounter: 81.6 kg (179 lb 12.8 oz).    Medication Reconciliation: complete      Norma J. Gosselin, LPN  "

## 2019-05-01 NOTE — PROGRESS NOTES
"  SUBJECTIVE:   Steven Panda is a 39 year old male who presents to clinic today for the following   health issues:      Acute Illness   Acute illness concerns: cough, phlegm production, sinus congestion  Onset:  3 weeks    Fever: no    Chills/Sweats: no    Headache (location?): no    Sinus Pressure:YES- post-nasal drainage and mucopurulent discharge    Conjunctivitis:  no    Ear Pain: no    Rhinorrhea: YES- yellowish    Congestion: YES    Sore Throat: no     Cough: YES-non-productive, productive of yellow sputum, improving over time    Wheeze: YES- occasional slight wheeze    Decreased Appetite: no    Nausea: no    Vomiting: no    Diarrhea:  no    Dysuria/Freq.: no    Fatigue/Achiness: no    Sick/Strep Exposure: no     Therapies Tried and outcome: Antihistamine    Additional history: as documented    Reviewed  and updated as needed this visit by clinical staff  Tobacco  Allergies  Meds  Med Hx  Surg Hx  Fam Hx  Soc Hx        Reviewed and updated as needed this visit by Provider         Patient Active Problem List   Diagnosis     Cerebral palsy, unspecified type (H)     History reviewed. No pertinent surgical history.    Social History     Tobacco Use     Smoking status: Former Smoker     Smokeless tobacco: Never Used   Substance Use Topics     Alcohol use: Yes     Family History   Problem Relation Age of Onset     No Known Problems Mother      No Known Problems Father      Marfan Syndrome Brother      Marfan Syndrome Niece      Marfan Syndrome Nephew          Current Outpatient Medications   Medication Sig Dispense Refill     albuterol (PROAIR HFA/PROVENTIL HFA/VENTOLIN HFA) 108 (90 Base) MCG/ACT inhaler Inhale 2 puffs into the lungs 4 times daily 1 Inhaler 0     No Known Allergies    ROS:  As above    OBJECTIVE:     /80   Pulse 64   Temp 98.8  F (37.1  C) (Temporal)   Resp 20   Ht 1.943 m (6' 4.5\")   Wt 81.6 kg (179 lb 12.8 oz)   SpO2 (!) 89%   BMI 21.60 kg/m    Body mass index is 21.6 " kg/m .  GENERAL: healthy, alert and no distress  EYES: Eyes grossly normal to inspection, PERRL and conjunctivae and sclerae normal  HENT: normal cephalic/atraumatic, both ears: clear effusion, nasal mucosa edematous , rhinorrhea clear, oral mucous membranes moist, sinuses: not tender and cobblestoning of posterior oropharynx  NECK: no adenopathy, no asymmetry, masses, or scars and thyroid normal to palpation  RESP: lungs clear to auscultation - no rales, rhonchi or wheezes  CV: regular rate and rhythm, normal S1 S2, no S3 or S4, no murmur, click or rub, no peripheral edema and peripheral pulses strong  ABDOMEN: soft, nontender, no hepatosplenomegaly, no masses and bowel sounds normal  SKIN: no suspicious lesions or rashes  NEURO: Normal strength and tone, mentation intact and speech normal  PSYCH: mentation appears normal, affect normal/bright    Diagnostic Test Results:  none     ASSESSMENT/PLAN:     1. Viral URI with cough  Symptomatic treatments recommended.  -Discussed that antibiotics would not help symptoms of viral URI. Education provided on symptoms of secondary bacterial infection such as new fever, chills, rigors, shortness of breath, increased work of breathing, that can occur with viral URI and need for further evaluation, if they occur.   - Ensure you are staying hydrated by drinking plenty of fluids or eating foods such as popsicles, jello, pudding.  - Honey and Salt water gurgles can help soothe sore throat  - Rest  - Humidifier can help with congestion and help keep mucus membranes such as throat and nose from drying out.  - Sleeping slightly propped up can help with congestion and postnasal drainage that can worsen cough at bedtime.  - As long as you have never been told to take Tylenol and/or Ibuprofen you can use them to manage fever and body aches per package instructions  Make sure you eat when you take ibuprofen to avoid stomach upset.  - OTC cough medications per package instructions to help  with cough. Check to see if the cough/cold medication already has acetaminophen (Tylenol) in it. If it does avoid taking additional Tylenol.  - If sudden onset of new fever, worsening symptoms return for further evaluation.  - OTC antihistamine such as Allegra, Zyrtec, Claritin (generic is okay) can help with nasal/sinus congestion and OTC nasal steroid such as Flonase can help decrease sinus inflammation to help with congestion.    Gladys Mcgee NP  [facility]

## 2019-06-20 ENCOUNTER — OFFICE VISIT (OUTPATIENT)
Dept: UROLOGY | Facility: OTHER | Age: 39
End: 2019-06-20
Attending: UROLOGY
Payer: COMMERCIAL

## 2019-06-20 VITALS
BODY MASS INDEX: 20.4 KG/M2 | HEART RATE: 72 BPM | SYSTOLIC BLOOD PRESSURE: 112 MMHG | WEIGHT: 169.8 LBS | DIASTOLIC BLOOD PRESSURE: 78 MMHG | RESPIRATION RATE: 12 BRPM

## 2019-06-20 DIAGNOSIS — Z30.09 ENCOUNTER FOR VASECTOMY COUNSELING: Primary | ICD-10-CM

## 2019-06-20 PROCEDURE — 99204 OFFICE O/P NEW MOD 45 MIN: CPT | Performed by: UROLOGY

## 2019-06-20 RX ORDER — HYDROCODONE BITARTRATE AND ACETAMINOPHEN 5; 325 MG/1; MG/1
TABLET ORAL
Qty: 5 TABLET | Refills: 0 | Status: SHIPPED | OUTPATIENT
Start: 2019-06-20 | End: 2020-12-11

## 2019-06-20 ASSESSMENT — PAIN SCALES - GENERAL: PAINLEVEL: NO PAIN (0)

## 2019-06-20 NOTE — PROGRESS NOTES
Type of Visit  NPV    Chief Complaint  Elective sterilization    HPI  Mr. Panda is a 39 year old male who presents with desire for irreversible, elective sterilization.      He has been considering this decision for 2 years and reports a high level of certainty regarding this decision.   His wife is supportive and has been involved in making this decision.   Their main reason for choosing vasectomy over other dependably reversible methods of contraception is they perceived it as the most secure way of avoiding pregnancy and they dislike other contraceptive measures.   He has 3 children.  He does not have interest in future fertility.   He has made this decision free any coercion. He does have some anxiety/fear regarding this procedure, specifically regarding the anticipated pain during the procedure.   He is not anxious/fearful of the finality/permanence of the procedure.   He is not anxious/fearful of the potential/risk of complications.  He denies erectile dysfunction.  He denies a history of bleeding disorders or reactions to local anesthetic.      Past Medical History  He  has no past medical history on file.  Patient Active Problem List   Diagnosis     Cerebral palsy, unspecified type (H)       Past Surgical History  He  has no past surgical history on file.    Medications  He has a current medication list which includes the following prescription(s): hydrocodone-acetaminophen and albuterol.    Allergies  No Known Allergies    Social History  He  reports that he has quit smoking. His smoking use included cigarettes. He has never used smokeless tobacco. He reports that he drinks alcohol. He reports that he does not use drugs.  No drug abuse.    Family History  Family History   Problem Relation Age of Onset     No Known Problems Mother      No Known Problems Father      Marfan Syndrome Brother      Marfan Syndrome Niece      Marfan Syndrome Nephew        Review of Systems  I personally reviewed the ROS with the  patient.    Nursing Notes:   Ana Torre RN  6/20/2019  8:49 AM  Sign at exiting of workspace  Review of Systems:    Weight loss:    No     Recent fever/chills:  No   Night sweats:   No  Current skin rash:  No   Recent hair loss:  No  Heat intolerance:  No   Cold intolerance:  No  Chest pain:   No   Palpitations:   No  Shortness of breath:  No   Wheezing:   No  Constipation:    No   Diarrhea:   No   Nausea:   No   Vomiting:   No   Kidney/side pain:  No   Back pain:   No  Frequent headaches:  No   Dizziness:     No  Leg swelling:   No   Calf pain:    No    Parents, brothers or sisters with history of kidney cancer:   No  Parents, brothers or sisters with history of bladder cancer: No    Physical Exam  Vitals:    06/20/19 0849   Pulse: 72   Resp: 12   Weight: 77 kg (169 lb 12.8 oz)   Constitutional: NAD, WDWN.   Head: NCAT  Eyes: Conjunctivae normal  Cardiovascular: Regular rate.  Pulmonary/Chest: Respirations are even and non-labored bilaterally.  Abdominal: Soft. No distension, tenderness, masses or guarding. No CVA tenderness.  Neurological: A + O x 3.  Cranial Nerves II-XII grossly intact.  Extremities: JUSTYNA x 4, Warm. No clubbing.  No cyanosis.    Skin: Pink, warm and dry.  No rashes noted.  Psychiatric:  Normal mood and affect  Genitourinary:  Phallus normal without lesions, testicles descended bilaterally, no masses.    Bilateral vasa palpable    Assessment  Mr. Panda is a 39 year old male who presents today requesting permanent, irreversible surgical sterilization.  The vasectomy procedure was discussed in detail with the patient today including the following:  - Preparation prior to the procedure  - Expectations the day of the procedure  - Risks of the procedure such as sterilization failure, infection, bleeding and/or development of chronic testicular pain.    - Expectations and limitations during recovery    Furthermore, the patient was told he would remain fertile following the procedure until he  provided a semen analysis that met the definition of infertile (no sperm present or <100,000 nonmotile sperm/mL).  This is usually planned for 3 months after the procedure.  The patient expressed understanding and desire to proceed.    Plan  Norco Rx provided for post procedure pain management   He understands he needs a  the day of the procedure if he chooses to take the medication  Provided vasectomy hand out again outlining the above risks and benefits as well as need for follow up semen analysis

## 2019-07-11 ENCOUNTER — OFFICE VISIT (OUTPATIENT)
Dept: UROLOGY | Facility: OTHER | Age: 39
End: 2019-07-11
Attending: UROLOGY
Payer: COMMERCIAL

## 2019-07-11 VITALS — WEIGHT: 169.8 LBS | RESPIRATION RATE: 12 BRPM | BODY MASS INDEX: 20.4 KG/M2 | HEART RATE: 80 BPM

## 2019-07-11 DIAGNOSIS — Z30.2 ENCOUNTER FOR VASECTOMY: Primary | ICD-10-CM

## 2019-07-11 PROCEDURE — 88302 TISSUE EXAM BY PATHOLOGIST: CPT

## 2019-07-11 PROCEDURE — 55250 REMOVAL OF SPERM DUCT(S): CPT | Performed by: UROLOGY

## 2019-07-11 ASSESSMENT — PAIN SCALES - GENERAL: PAINLEVEL: NO PAIN (0)

## 2019-07-11 NOTE — NURSING NOTE
Vasectomy  Per verbal order read back by Arnold Martinez MD to prep patient for vasectomy.  Patient positioned in supine position, perineum area prepped with chlorhexidene Gluconate and patient draped per sterile technique.    Lidocaine 2%  Lot #: [id number]  Expiration date: 11/22  [facility] [id number]    [facility] Protocol    A. Pre-procedure verification complete Yes  1-relevant information / documentation available, reviewed and properly matched to the patient; 2-consent accurate and complete, 3-equipment and supplies available    B. Site marking complete N/A  Site marked if not in continuous attendance with patient    C. TIME OUT completed Yes  Time Out was conducted just prior to starting procedure to verify the eight required elements: 1-patient identity, 2-consent accurate and complete, 3-position, 4-correct side/site marked (if applicable), 5-procedure, 6-relevant images / results properly labeled and displayed (if applicable), 7-antibiotics / irrigation fluids (if applicable), 8-safety precautions.    After procedure perineum area rinsed. Semen analysis container given to patient. Patient reminded to have a semen analysis performed 3 months after the procedure to confirm sterility and to ejaculate about 1-2 dozen times following the vasectomy and prior to semen collection. Discharge instructions reviewed with patient. Patient verbalized understanding of discharge instructions and discharged ambulatory.

## 2019-07-11 NOTE — PATIENT INSTRUCTIONS
Home Care after Vasectomy   Follow these guidelines for your care after your surgery to help your recovery.     Activity   Limit your activity for the first 5 days after the procedure to light activity.   You may return to work typically in 2 days.   Limit lifting, pushing or pulling to less than 20 pounds until you are no longer sore.   Limit running and long walks until you are no longer sore.   Limit sexual activity for 5 days.     Swelling   Scrotal swelling from the surgery may take weeks to get better. You should call your doctor if the swelling is severe and the scrotum is larger than an orange.  Apply a bag of frozen peas to the scrotum for 15 minutes every 1-2 hours for the first 48 hours when you are awake to limit swelling. It works best to not apply the bag of frozen peas directly to the skin.  Wear a jock strap to support your scrotum and reduce swelling.  Continue wearing the jock strap until you are no longer sore, 1-2 weeks.     Incision care   The single stitch placed in the scrotum will dissolve and does not need to be removed.  A small amount of blood may stain the dressings for up to 2-3 days after the procedure.  For the first few days, apply two or three gauze pads to the site each day and as needed to keep the dressing dry. This will protect the incision and help keep your clothes clean.  When you are no longer having any drainage, stop using the gauze pads over the site.     Bathing or showering   You may shower after the procedure but do not scrub the stitch area.  Allow the water to wash over the stitch and do not scrub the area. Dry the site gently by patting it with a clean towel.  Tub baths should be avoided for 7 days after surgery.  Swimming should be avoided for 14 days after surgery.        Pain control   You were provided with a pain medication prescription during the clinic consultation, please use this as needed.  Also, please take ibuprofen as we discussed in clinic.  Pain most  often is eased after 5 to 7 days.     Sexual activity   Please avoid ejaculating for 5 days after procedure or until soreness is resolved.     Follow up   Following this procedure you are still considered fertile and would be able to impregnate your partner.  You should have a semen analysis performed 3 months or greater after your procedure to confirm sterility.  Ideally you would ejaculate about 2-3 dozen times following the vasectomy and prior to semen collection.  Use birth control until the semen analysis is confirmed sterile.     Use contraceptives until this is confirmed to prevent pregnancy.     Contacts   General Questions: [phone]   Appointments: [phone]   Emergencies: [phone]     When to call your doctor   Call the urology office if you have any of the following:   Severe swelling, larger than the size of an orange   A large amount of fluid drainage that soaks several pads per day   Pain that is not controlled with pain medicine, jock strap and use of frozen peas   Any signs of infection such as the following:   -Redness or tenderness around the incision site worsening after 2-3 days or   -Pus type drainage from the incision or   -Fever of greater than 101 degrees F     Also call the office if you have any questions or concerns about your care.

## 2019-07-11 NOTE — ADDENDUM NOTE
Addended by: BROOK SCOTT on: 7/11/2019 03:56 PM     Modules accepted: Orders     SUBJECTIVE:                                                      Chloé Kimball is a 18 06/15/2009, 01/18/2011     Influenza Intranasal Vaccine 01/18/2011, 10/22/2011     MMR 04/18/2005     MMR/V 05/11/2007     Pneumococcal (PCV 7) 2002, 02/21/2003, 03/31/2003     Poliovirus, inactivated (IPV) 2002, 2002, 04/18/2005, 05/11/2007     Varicella 03/31/Maple Grove ([id number])  - drospirenone-ethinyl estradiol (RM) Rafaela Dyer PNP, APRN [facility]

## 2019-07-11 NOTE — PROGRESS NOTES
Preoperative diagnosis  Elective sterilization    Postoperative diagnosis  Elective sterilization    Procedure performed  Bilateral vasectomy    Surgeon  Arnold Martinez MD    Anesthesia  8 mL lidocaine 2% local injection    Complications  None    EBL  <1 mL    Specimen  Left vas  Right vas    Indications  39 year old male agreed to undergo the above named procedure after discussion of the alternatives, risks and benefits.  Informed consent was obtained.      Procedure  The patient was brought to the procedure room and placed in supine position.  His scrotum was prepped with Hibiclens and he was draped in a sterile fashion.  A timeout was performed.    Lidocaine 2% was used to anesthetize the scrotal skin as well as perivasal sheath initially on the patient's left.  A 1 cm skin incision was created with the sharp-tip mosquito and a vas clamp utilized through this incision to grasp the vas.  The left vas was elevated to the skin surface and dissected free of its perivasal sheath.  The vas was transected and the lumen was cauterized both proximally and distally.  A 4-0 chromic suture was utilized to place the proximal vasal portion under the perivasal sheath, such that the 2 ends were divided by the perivasal sheath (fascial interposition).  This portion of the vas was then allowed to drop back into the left hemiscrotum.  The right side was treated next in the same manner as described above.  The skin incision was closed with the 4-0 chromic suture.  The patient tolerated the procedure well.    It was again reiterated to the patient that he would remain fertile for sometime and should present to the office for a post-vacectomy semen analysis to confirm sterility in 3 months.  The patient again expressed understanding.

## 2019-09-24 ENCOUNTER — TRANSFERRED RECORDS (OUTPATIENT)
Dept: HEALTH INFORMATION MANAGEMENT | Facility: OTHER | Age: 39
End: 2019-09-24

## 2019-09-24 LAB
ALT SERPL-CCNC: 14 IU/L (ref 0–44)
AST SERPL-CCNC: 16 IU/L (ref 0–40)
CHOLEST SERPL-MCNC: 129 MG/DL (ref 100–199)
CREAT SERPL-MCNC: 1.02 MG/DL (ref 0.76–1.27)
GLUCOSE SERPL-MCNC: 94 MG/DL (ref 65–99)
HDLC SERPL-MCNC: 55 MG/DL
LDLC SERPL CALC-MCNC: 67 MG/DL (ref 0–99)
POTASSIUM SERPL-SCNC: 5 MMOL/L (ref 3.5–5.2)
TRIGL SERPL-MCNC: 34 MG/DL (ref 0–149)

## 2019-11-01 DIAGNOSIS — Z30.2 ENCOUNTER FOR VASECTOMY: ICD-10-CM

## 2019-11-01 LAB
SPECIMEN VOL SMN: 5.8 ML
SPERM P VAS #/AREA SMN HPF: NORMAL /[HPF]

## 2019-11-01 PROCEDURE — 89310 SEMEN ANALYSIS W/COUNT: CPT | Mod: ZL | Performed by: UROLOGY

## 2019-11-01 PROCEDURE — 36415 COLL VENOUS BLD VENIPUNCTURE: CPT | Mod: ZL | Performed by: UROLOGY

## 2019-11-01 PROCEDURE — 89321 SEMEN ANAL SPERM DETECTION: CPT | Mod: ZL | Performed by: UROLOGY

## 2020-12-11 ENCOUNTER — OFFICE VISIT (OUTPATIENT)
Dept: INTERNAL MEDICINE | Facility: OTHER | Age: 40
End: 2020-12-11
Attending: INTERNAL MEDICINE
Payer: COMMERCIAL

## 2020-12-11 VITALS
DIASTOLIC BLOOD PRESSURE: 70 MMHG | WEIGHT: 177.8 LBS | TEMPERATURE: 97.7 F | RESPIRATION RATE: 16 BRPM | BODY MASS INDEX: 20.57 KG/M2 | HEIGHT: 78 IN | HEART RATE: 67 BPM | OXYGEN SATURATION: 97 % | SYSTOLIC BLOOD PRESSURE: 122 MMHG

## 2020-12-11 DIAGNOSIS — G80.9 CEREBRAL PALSY, UNSPECIFIED TYPE (H): ICD-10-CM

## 2020-12-11 DIAGNOSIS — Z00.00 ANNUAL PHYSICAL EXAM: Primary | ICD-10-CM

## 2020-12-11 DIAGNOSIS — L72.9 CUTANEOUS CYST: ICD-10-CM

## 2020-12-11 DIAGNOSIS — Z23 NEED FOR DIPHTHERIA-TETANUS-PERTUSSIS (TDAP) VACCINE: ICD-10-CM

## 2020-12-11 PROCEDURE — 99395 PREV VISIT EST AGE 18-39: CPT | Mod: 25 | Performed by: INTERNAL MEDICINE

## 2020-12-11 PROCEDURE — 90471 IMMUNIZATION ADMIN: CPT | Performed by: INTERNAL MEDICINE

## 2020-12-11 PROCEDURE — 90715 TDAP VACCINE 7 YRS/> IM: CPT | Performed by: INTERNAL MEDICINE

## 2020-12-11 SDOH — HEALTH STABILITY: MENTAL HEALTH: HOW OFTEN DO YOU HAVE 6 OR MORE DRINKS ON ONE OCCASION?: NOT ASKED

## 2020-12-11 SDOH — HEALTH STABILITY: MENTAL HEALTH: HOW MANY STANDARD DRINKS CONTAINING ALCOHOL DO YOU HAVE ON A TYPICAL DAY?: NOT ASKED

## 2020-12-11 SDOH — HEALTH STABILITY: MENTAL HEALTH: HOW OFTEN DO YOU HAVE A DRINK CONTAINING ALCOHOL?: MONTHLY OR LESS

## 2020-12-11 ASSESSMENT — ENCOUNTER SYMPTOMS
AGITATION: 0
VOMITING: 0
DIZZINESS: 0
CONFUSION: 0
WOUND: 0
FEVER: 0
PALPITATIONS: 0
BRUISES/BLEEDS EASILY: 0
MYALGIAS: 0
DYSURIA: 0
HEMATURIA: 0
DIARRHEA: 0
WHEEZING: 0
ARTHRALGIAS: 0
NAUSEA: 0
CHILLS: 0
COUGH: 0
ABDOMINAL PAIN: 0
LIGHT-HEADEDNESS: 0
SHORTNESS OF BREATH: 0

## 2020-12-11 ASSESSMENT — MIFFLIN-ST. JEOR: SCORE: 1851.81

## 2020-12-11 ASSESSMENT — PAIN SCALES - GENERAL: PAINLEVEL: NO PAIN (0)

## 2020-12-11 NOTE — NURSING NOTE
Chief Complaint   Patient presents with     Physical     lump on right knee   Patient presents to the clinic today for a physical and a lump on right knee. Patient states the lump has been on knee for at least 3 years.    Medication Reconciliation: completed   Romina Zazueta LPN  12/11/2020 10:11 AM

## 2020-12-11 NOTE — PATIENT INSTRUCTIONS
Will talk with surgery about your right knee cyst.   -- will order imaging if needed.       There is no immunization history on file for this patient.     -- Tetanus shot today.     Return in approximately 1-2 years, or sooner as needed for follow-up with Dr. Han.  - Annual Follow-up / Physical     Clinic : [phone]  Appointment line: [phone]

## 2020-12-11 NOTE — PROGRESS NOTES
Nursing Notes:   Romina Zazueta, LPN  12/11/2020 10:24 AM  Signed  Chief Complaint   Patient presents with     Physical     lump on right knee   Patient presents to the clinic today for a physical and a lump on right knee. Patient states the lump has been on knee for at least 3 years.    Medication Reconciliation: completed   Romina Zazueta LPN  12/11/2020 10:11 AM     Nursing note reviewed with patient.  Accuracy and completeness verified.   Mr. Panda is a 40 year old male who:  Patient presents with:  Physical: lump on right knee      ICD-10-CM    1. Annual physical exam  Z00.00    2. Cerebral palsy, unspecified type (H) - has mild weakness / numbness in left arm/hand  G80.9    3. Cutaneous cyst of right knee  L72.9 Orthopedic & Spine  Referral   4. Need for diphtheria-tetanus-pertussis (Tdap) vaccine  Z23 TDAP VACCINE (Adacel, Boostrix)  [[id number]]     HPI  Patient presents for annual physical.  He gets lab work done through work.  His LDL was in the 82 range.    History of cerebral palsy this affected his left arm and hand.  He gets some numbness at times.  Has some chronic mild weakness of the left arm/hand.  Otherwise no significant issues.  No seizures.    He has some type of a cyst just medial to his right kneecap.  See photograph below.  Normally does not bother him.  Has been slowly enlarging.  He did bump it around Thanksgiving time and got quite swollen and painful.  He would like to have it removed if possible.  Orthopedic referral sent.  --We may end up needing some knee imaging.  I will talk with orthopedics about this.    Tetanus shot is due, orders placed.    Declines other lab work today as they were just done through work.    Functional Capacity: > 4 METS.   Review of Systems   Constitutional: Negative for chills and fever.   HENT: Negative for congestion and hearing loss.    Eyes: Negative for visual disturbance.   Respiratory: Negative for cough, shortness of breath and wheezing.   "  Cardiovascular: Negative for chest pain and palpitations.   Gastrointestinal: Negative for abdominal pain, diarrhea, nausea and vomiting.   Endocrine: Negative for cold intolerance and heat intolerance.   Genitourinary: Negative for dysuria and hematuria.   Musculoskeletal: Negative for arthralgias and myalgias.        Cyst like lesion on right knee - bumped at thanksgiving, got very swollen and painful.    Skin: Negative for rash and wound.   Allergic/Immunologic: Negative for immunocompromised state.   Neurological: Negative for dizziness and light-headedness.   Hematological: Does not bruise/bleed easily.   Psychiatric/Behavioral: Negative for agitation and confusion.        Reviewed and updated as needed this visit by Provider   Tobacco  Allergies  Meds  Problems  Med Hx  Surg Hx  Fam Hx          EXAM:   Vitals:    12/11/20 1012   BP: 122/70   BP Location: Right arm   Patient Position: Sitting   Cuff Size: Adult Large   Pulse: 67   Resp: 16   Temp: 97.7  F (36.5  C)   TempSrc: Tympanic   SpO2: 97%   Weight: 80.6 kg (177 lb 12.8 oz)   Height: 1.969 m (6' 5.5\")       Current Pain Score: No Pain (0)     BP Readings from Last 3 Encounters:   12/11/20 122/70   06/20/19 112/78   05/01/19 124/80      Wt Readings from Last 3 Encounters:   12/11/20 80.6 kg (177 lb 12.8 oz)   07/11/19 77 kg (169 lb 12.8 oz)   06/20/19 77 kg (169 lb 12.8 oz)      Estimated body mass index is 20.81 kg/m  as calculated from the following:    Height as of this encounter: 1.969 m (6' 5.5\").    Weight as of this encounter: 80.6 kg (177 lb 12.8 oz).     Physical Exam  Constitutional:       General: He is not in acute distress.     Appearance: He is well-developed. He is not diaphoretic.   HENT:      Head: Normocephalic and atraumatic.   Eyes:      General: No scleral icterus.     Conjunctiva/sclera: Conjunctivae normal.   Neck:      Musculoskeletal: Neck supple.   Cardiovascular:      Rate and Rhythm: Normal rate and regular rhythm. "   Pulmonary:      Effort: Pulmonary effort is normal.      Breath sounds: Normal breath sounds.   Abdominal:      Palpations: Abdomen is soft.      Tenderness: There is no abdominal tenderness.   Musculoskeletal:         General: No tenderness or deformity.      Right lower leg: No edema.      Left lower leg: No edema.      Comments: Mobile, firm, cyst-like lesion on right knee, medial to patella   Lymphadenopathy:      Cervical: No cervical adenopathy.   Skin:     General: Skin is warm and dry.      Findings: No rash.   Neurological:      Mental Status: He is alert and oriented to person, place, and time. Mental status is at baseline.   Psychiatric:         Mood and Affect: Mood normal.         Behavior: Behavior normal.                 Procedures   INVESTIGATIONS:  - Labs reviewed in Epic     ASSESSMENT AND PLAN:  Problem List Items Addressed This Visit        Nervous and Auditory    Cerebral palsy, unspecified type (H) - has mild weakness / numbness in left arm/hand       Musculoskeletal and Integumentary    Cutaneous cyst of right knee    Relevant Orders    Orthopedic & Spine  Referral      Other Visit Diagnoses     Annual physical exam    -  Primary    Need for diphtheria-tetanus-pertussis (Tdap) vaccine        Relevant Orders    TDAP VACCINE (Adacel, Boostrix)  [[id number]] (Completed)        reviewed diet, exercise and weight control, cardiovascular risk and specific lipid/LDL goals reviewed  -- Expected clinical course discussed    -- Medications and their side effects discussed    Patient Instructions   Will talk with surgery about your right knee cyst.   -- will order imaging if needed.       There is no immunization history on file for this patient.     -- Tetanus shot today.     Return in approximately 1-2 years, or sooner as needed for follow-up with Dr. Han.  - Annual Follow-up / Physical     Clinic : [phone]  Appointment line: [phone]     Victor M Han MD   Internal  Medicine  [facility]     Portions of this note were dictated using speech recognition software. The note has been proofread but errors in the text may have been overlooked. Please contact me if there are any concerns regarding the accuracy of the dictation.

## 2020-12-14 DIAGNOSIS — L72.9 CUTANEOUS CYST: Primary | ICD-10-CM

## 2020-12-28 ENCOUNTER — HOSPITAL ENCOUNTER (OUTPATIENT)
Dept: MRI IMAGING | Facility: OTHER | Age: 40
Discharge: HOME | End: 2020-12-28
Attending: INTERNAL MEDICINE | Admitting: INTERNAL MEDICINE
Payer: COMMERCIAL

## 2020-12-28 DIAGNOSIS — L72.9 CUTANEOUS CYST: ICD-10-CM

## 2020-12-28 PROCEDURE — 255N000002 HC RX 255 OP 636: Performed by: INTERNAL MEDICINE

## 2020-12-28 PROCEDURE — 73723 MRI JOINT LWR EXTR W/O&W/DYE: CPT | Mod: RT

## 2020-12-28 PROCEDURE — A9575 INJ GADOTERATE MEGLUMI 0.1ML: HCPCS | Performed by: INTERNAL MEDICINE

## 2020-12-28 RX ORDER — GADOTERATE MEGLUMINE 376.9 MG/ML
15 INJECTION INTRAVENOUS ONCE
Status: COMPLETED | OUTPATIENT
Start: 2020-12-28 | End: 2020-12-28

## 2020-12-28 RX ADMIN — GADOTERATE MEGLUMINE 15 ML: 376.9 INJECTION INTRAVENOUS at 16:03

## 2021-01-04 ENCOUNTER — OFFICE VISIT (OUTPATIENT)
Dept: ORTHOPEDICS | Facility: OTHER | Age: 41
End: 2021-01-04
Attending: INTERNAL MEDICINE
Payer: COMMERCIAL

## 2021-01-04 VITALS
SYSTOLIC BLOOD PRESSURE: 128 MMHG | DIASTOLIC BLOOD PRESSURE: 86 MMHG | HEIGHT: 78 IN | OXYGEN SATURATION: 95 % | WEIGHT: 177 LBS | HEART RATE: 85 BPM | RESPIRATION RATE: 16 BRPM | BODY MASS INDEX: 20.48 KG/M2

## 2021-01-04 DIAGNOSIS — L72.9 CUTANEOUS CYST: ICD-10-CM

## 2021-01-04 PROCEDURE — 99203 OFFICE O/P NEW LOW 30 MIN: CPT | Performed by: ORTHOPAEDIC SURGERY

## 2021-01-04 ASSESSMENT — PAIN SCALES - GENERAL: PAINLEVEL: NO PAIN (0)

## 2021-01-04 ASSESSMENT — MIFFLIN-ST. JEOR: SCORE: 1848.5

## 2021-01-04 NOTE — PROGRESS NOTES
Visit Date:   01/04/2021      CHIEF COMPLAINT:  A 40-year-old gentleman with right knee pain.      HISTORY OF PRESENT ILLNESS:  Steven Panda is a 40-year-old gentleman with a known bump on his right knee.  He was wrestling with his kids on Thanksgiving and injured the lump on the anterior and medial portion of his right knee.  He finally went in to get an MRI done of it to have it looked at since it became so painful and just to kind of get it checked out.  This showed that he had a subcutaneous cyst medial to the patella on the anterior of the knee.  This did not appear to be too concerning.  It is relatively walled off and only mildly homogenous in nature.      PHYSICAL EXAMINATION:  On examination today, this is a 40-year-old gentleman in no acute distress, very pleasant on examination.  He has full range of motion of his knee.  Stable to varus and valgus stress.  Negative Lachman's, negative posterior drawer.  He has a small lump noted on the medial side of his knee over the extensor retinaculum just medial to the patella.  This is quite rubbery and firm, but definitely ballottable underneath the skin.  Nontender to palpation really.  Not red and relatively mobile.  He is otherwise neuro and vascularly intact in the right lower extremity.      IMAGING:  MRI of the right knee shows a small cyst, almost 2 cm in height and approximately 1 cm in depth.  It is mildly homogenous with a significant rind over it.      IMPRESSION AND PLAN:  This is a 40-year-old gentleman with a cutaneous cyst.  We talked about it at length.  There is really no reason why we need to take care of this right away.  All this was expressed to him.  He has some other priorities currently that he needs to address.  With his son requiring some surgeries, he would like to hold off on doing anything with it, and I think that this is a reasonable approach.  I did caution him that if anything changes, that he probably needs to come in sooner  rather than later to have it addressed.  This will be a simple cyst excision subcutaneously.  All this was expressed to him.  He understands this.        We are going to see him back on an as-needed basis.         AMBROSOI PIMENTEL MD             D: 2021   T: 2021   MT: MAICOL      Name:     CHARLES RAHMAN   MRN:      [id number]        Account:      [id number]   :      1980           Visit Date:   2021      Document: [id number]

## 2021-01-04 NOTE — PROGRESS NOTES
"Chief Complaint   Patient presents with     Consult     right knee cyst       Initial /86 (BP Location: Right arm, Patient Position: Sitting, Cuff Size: Adult Regular)   Pulse 85   Resp 16   Ht 1.969 m (6' 5.52\")   Wt 80.3 kg (177 lb)   SpO2 95%   BMI 20.71 kg/m   Estimated body mass index is 20.71 kg/m  as calculated from the following:    Height as of this encounter: 1.969 m (6' 5.52\").    Weight as of this encounter: 80.3 kg (177 lb).  Medication Reconciliation: complete    Zara Anthony LPN    "

## 2024-02-21 ENCOUNTER — TRANSFERRED RECORDS (OUTPATIENT)
Dept: HEALTH INFORMATION MANAGEMENT | Facility: OTHER | Age: 44
End: 2024-02-21
Payer: COMMERCIAL